# Patient Record
Sex: MALE | Race: WHITE | HISPANIC OR LATINO | Employment: UNEMPLOYED | URBAN - METROPOLITAN AREA
[De-identification: names, ages, dates, MRNs, and addresses within clinical notes are randomized per-mention and may not be internally consistent; named-entity substitution may affect disease eponyms.]

---

## 2018-06-08 ENCOUNTER — OFFICE VISIT - HEALTHEAST (OUTPATIENT)
Dept: INTERNAL MEDICINE | Facility: CLINIC | Age: 33
End: 2018-06-08

## 2018-06-08 DIAGNOSIS — F32.1 MODERATE SINGLE CURRENT EPISODE OF MAJOR DEPRESSIVE DISORDER (H): ICD-10-CM

## 2018-06-08 DIAGNOSIS — Z11.4 SCREENING FOR HIV (HUMAN IMMUNODEFICIENCY VIRUS): ICD-10-CM

## 2018-06-08 DIAGNOSIS — Z00.00 ROUTINE GENERAL MEDICAL EXAMINATION AT A HEALTH CARE FACILITY: ICD-10-CM

## 2018-06-08 DIAGNOSIS — Z13.220 SCREENING FOR HYPERLIPIDEMIA: ICD-10-CM

## 2018-06-08 DIAGNOSIS — Z11.59 NEED FOR HEPATITIS C SCREENING TEST: ICD-10-CM

## 2018-06-08 DIAGNOSIS — Z11.3 SCREEN FOR STD (SEXUALLY TRANSMITTED DISEASE): ICD-10-CM

## 2018-06-08 ASSESSMENT — MIFFLIN-ST. JEOR: SCORE: 1726.9

## 2018-06-12 ENCOUNTER — COMMUNICATION - HEALTHEAST (OUTPATIENT)
Dept: INTERNAL MEDICINE | Facility: CLINIC | Age: 33
End: 2018-06-12

## 2021-06-01 VITALS — BODY MASS INDEX: 24.48 KG/M2 | HEIGHT: 70 IN | WEIGHT: 171 LBS

## 2021-06-18 NOTE — PROGRESS NOTES
"  Office Visit - Physical   Kendall Pederson   32 y.o.  male    Date of visit: 6/8/2018  Physician: Cj Neumann MD     Assessment and Plan   1. Routine general medical examination at a health care facility  This is a 32-year-old man who was recently released from sn[facility].  He is homeless.  He has a prolapse or and has resources in the community.  He also has some brothers and sisters with him using contacts.  He has been depressed and would like to resume medications have been taking in snf.  See details below    2. Moderate single current episode of major depressive disorder  We will resume venlafaxine but not at 300 mg daily as this exceeds the recommended daily dose.  Rather we will restart at 150 mg daily.  Continue Remeron at 15 mg daily and establish with psychiatry.    \" Ambulatory referral to Psychiatry    3. Screen for STD (sexually transmitted disease)  Check gonorrhea chlamydia syphilis    4. Screening for HIV (human immunodeficiency virus)  HIV ordered    5. Need for hepatitis C screening test  Screening tests ordered, has a lot of soft tattoos, denies ever using IV drugs    6. Screening for hyperlipidemia  Lipid panel    Return in about 4 weeks (around 7/6/2018) for recheck.     Chief Complaint   Chief Complaint   Patient presents with     Medication Refill        Patient Profile   Social History     Social History Narrative    Homeless.  In snf previous 16 months (6/8/2018).          Past Medical History   Patient Active Problem List   Diagnosis     Major depressive disorder, single episode       Past Surgical History  He has a past surgical history that includes No past surgeries.     History of Present Illness   This 32 y.o. old man comes in for initial care after release from snf, annual physical and evaluation of depression.  He apparently been in snf for the past 16 months.  On present he was receiving venlafaxine 300 mg daily as well as mirtazapine 50 mg daily.  He generally had " "okay controlled depression, some depressed mood and feeling poorly about himself.  Since his been released he has been homeless.  He has multiple brothers and sisters has not been in contact with them.  He does have children but he has no contact with them.  He is not working.  He does have prolapse or.  He has been in contact with  and is applied for insurance as well as other social benefits.  He does not have housing.  His mood is been generally depressed.  He has not seen a psychiatrist or therapist.  He has some in the past.  Denies thoughts of self-harm or harm to others.    Review of Systems: A comprehensive review of systems was negative except as noted.     Medications and Allergies   Current Outpatient Prescriptions   Medication Sig Dispense Refill     mirtazapine (REMERON) 15 MG tablet Take 1 tablet (15 mg total) by mouth at bedtime. 90 tablet 3     venlafaxine (EFFEXOR-XR) 150 MG 24 hr capsule Take 1 capsule (150 mg total) by mouth daily. 90 capsule 3     No current facility-administered medications for this visit.      Allergies   Allergen Reactions     Fluoxetine Hives        Family and Social History   Family History   Problem Relation Age of Onset     Cancer Mother      unknown     No Medical Problems Father      lives in [place]     No Medical Problems Sister      No Medical Problems Brother      No Medical Problems Sister      No Medical Problems Sister      No Medical Problems Sister      No Medical Problems Sister      No Medical Problems Brother      No Medical Problems Brother      No Medical Problems Daughter      estranged     No Medical Problems Daughter      estranged        Social History   Substance Use Topics     Smoking status: Former Smoker     Smokeless tobacco: Never Used     Alcohol use No        Physical Exam   General Appearance:   No acute distress    /70 (Patient Site: Right Arm, Patient Position: Sitting, Cuff Size: Adult Regular)  Pulse 74  Ht 5' 10\" " (1.778 m)  Wt 171 lb (77.6 kg)  SpO2 99%  BMI 24.54 kg/m2    EYES: Eyelids, conjunctiva, and sclera were normal. Pupils were normal. Cornea, iris, and lens were normal bilaterally.  HEAD, EARS, NOSE, MOUTH, AND THROAT: Head and face were normal. Hearing was normal to voice and the ears were normal to external exam. Nose appearance was normal and there was no discharge. Oropharynx was normal.  NECK: Neck appearance was normal. There were no neck masses and the thyroid was not enlarged.  RESPIRATORY: Breathing pattern was normal and the chest moved symmetrically.  Percussion/auscultatory percussion was normal.  Lung sounds were normal and there were no abnormal sounds.  CARDIOVASCULAR: Heart rate and rhythm were normal.  S1 and S2 were normal and there were no extra sounds or murmurs. Peripheral pulses in arms and legs were normal.  Jugular venous pressure was normal.  There was no peripheral edema.  GASTROINTESTINAL: The abdomen was normal in contour.  Bowel sounds were present.  Percussion detected no organ enlargement or tenderness.  Palpation detected no tenderness, mass, or enlarged organs.   MUSCULOSKELETAL: Skeletal configuration was normal and muscle mass was normal for age. Joint appearance was overall normal.  LYMPHATIC: There were no enlarged nodes.  SKIN/HAIR/NAILS: Skin color was normal.  There were no skin lesions.  Hair and nails were normal.  NEUROLOGIC: The patient was alert and oriented to person, place, time, and circumstance. Speech was normal. Cranial nerves were normal. Motor strength was normal for age. The patient was normally coordinated.  PSYCHIATRIC:  Mood and affect were normal and the patient had normal recent and remote memory. The patient's judgment and insight were normal.       Additional Information        Cj Neumann MD  Internal Medicine  Contact me at None

## 2024-04-11 ENCOUNTER — TELEPHONE (OUTPATIENT)
Dept: FAMILY MEDICINE | Facility: CLINIC | Age: 39
End: 2024-04-11
Payer: COMMERCIAL

## 2024-04-11 NOTE — TELEPHONE ENCOUNTER
[facility] phone call message- general phone call:    Reason for call: Pt is calling and requesting a medical marijuana card.  He would like to be set up for those appts.    Return call needed: Yes    OK to leave a message on voice mail? Yes    Advised patient to response may take up to 2 business days: Yes    Primary language: English      needed? No    Call taken on April 11, 2024 at 4:51 PM by Danny Skaggs

## 2024-04-12 NOTE — TELEPHONE ENCOUNTER
Resources given to patient via phone as he states he needs the card by end of week next week. Reviewed our clinics process. THEODORE Fernández

## 2024-07-09 ENCOUNTER — HOSPITAL ENCOUNTER (EMERGENCY)
Facility: CLINIC | Age: 39
Discharge: HOME | End: 2024-07-09
Attending: STUDENT IN AN ORGANIZED HEALTH CARE EDUCATION/TRAINING PROGRAM | Admitting: STUDENT IN AN ORGANIZED HEALTH CARE EDUCATION/TRAINING PROGRAM
Payer: MEDICAID

## 2024-07-09 ENCOUNTER — HOSPITAL ENCOUNTER (EMERGENCY)
Facility: CLINIC | Age: 39
Discharge: HOME | End: 2024-07-09
Attending: EMERGENCY MEDICINE
Payer: MEDICAID

## 2024-07-09 VITALS
DIASTOLIC BLOOD PRESSURE: 85 MMHG | TEMPERATURE: 97.8 F | SYSTOLIC BLOOD PRESSURE: 157 MMHG | OXYGEN SATURATION: 98 % | HEART RATE: 80 BPM | RESPIRATION RATE: 16 BRPM

## 2024-07-09 VITALS
DIASTOLIC BLOOD PRESSURE: 104 MMHG | HEART RATE: 108 BPM | RESPIRATION RATE: 18 BRPM | TEMPERATURE: 98.2 F | SYSTOLIC BLOOD PRESSURE: 153 MMHG | OXYGEN SATURATION: 96 %

## 2024-07-09 DIAGNOSIS — Z59.00 HOMELESSNESS: ICD-10-CM

## 2024-07-09 DIAGNOSIS — F15.10 METHAMPHETAMINE USE (H): Primary | ICD-10-CM

## 2024-07-09 DIAGNOSIS — S60.311A ABRASION OF RIGHT THUMB, INITIAL ENCOUNTER: ICD-10-CM

## 2024-07-09 PROCEDURE — 99283 EMERGENCY DEPT VISIT LOW MDM: CPT

## 2024-07-09 PROCEDURE — 99282 EMERGENCY DEPT VISIT SF MDM: CPT

## 2024-07-09 SDOH — ECONOMIC STABILITY - HOUSING INSECURITY: HOMELESSNESS UNSPECIFIED: Z59.00

## 2024-07-09 ASSESSMENT — COLUMBIA-SUICIDE SEVERITY RATING SCALE - C-SSRS
2. HAVE YOU ACTUALLY HAD ANY THOUGHTS OF KILLING YOURSELF IN THE PAST MONTH?: NO
1. IN THE PAST MONTH, HAVE YOU WISHED YOU WERE DEAD OR WISHED YOU COULD GO TO SLEEP AND NOT WAKE UP?: NO
1. IN THE PAST MONTH, HAVE YOU WISHED YOU WERE DEAD OR WISHED YOU COULD GO TO SLEEP AND NOT WAKE UP?: NO
6. HAVE YOU EVER DONE ANYTHING, STARTED TO DO ANYTHING, OR PREPARED TO DO ANYTHING TO END YOUR LIFE?: NO
6. HAVE YOU EVER DONE ANYTHING, STARTED TO DO ANYTHING, OR PREPARED TO DO ANYTHING TO END YOUR LIFE?: NO
2. HAVE YOU ACTUALLY HAD ANY THOUGHTS OF KILLING YOURSELF IN THE PAST MONTH?: NO

## 2024-07-09 ASSESSMENT — ACTIVITIES OF DAILY LIVING (ADL)
ADLS_ACUITY_SCORE: 35
ADLS_ACUITY_SCORE: 35

## 2024-07-09 NOTE — ED PROVIDER NOTES
Emergency Department Note      History of Present Illness     Chief Complaint   Drug use    HPI   Kendall Pederson is a very pleasant 38 year old male presenting with homelessness and methamphetamine use.  Patient was brought in by ambulance from the street.  Bystanders called police due to the patient reportedly lying down in the street with his pants off.  Patient was cooperative with EMS.  On evaluation, patient is very pleasant.  He reports using methamphetamine, smoking, regularly and probably last night.  He is homeless and usually stays with friends.  He denies use of alcohol or other drugs.  He denies suicidal ideation, homicidal ideation, auditory or visual hallucinations.  He would like help with his homelessness.  He has no medical complaints or other concerns at this time.  He does wish to leave.    Independent Historian   None    Review of External Notes   I personally reviewed notes from the patient's urgent care visit dated  3/4/2024 . This provided me with information regarding patient's baseline medical problems.     I personally reviewed the patient's chart, including available medication list and available past medical history, past surgical history, family history, and social history.    Physical Exam     Patient Vitals for the past 24 hrs:   BP Temp Temp src Pulse Resp SpO2   07/09/24 0606 (!) 157/85 97.8  F (36.6  C) Temporal 80 16 98 %      Physical Exam   General: Pleasant, young male, lying on stretcher, no shift, pes excavatum, slightly twitchy  HENT: Atraumatic, normocephalic  Eyes: Extraocular movements intact  Cardiovascular: Regular rate  Pulmonary: Easy work of breathing  Skin: Warm and dry  Neuro: Alert and oriented x 4  Psych: Cooperative, appropriate affect, normal speech, denies suicidal ideation, not visibly reacting to internal stimuli    Diagnostics     Lab Results   Labs Ordered and Resulted from Time of ED Arrival to Time of ED Departure - No data to display    Imaging   No  orders to display       EKG  No ECG performed.     Independent Interpretation   See ED Course below    ED Course      Medications Administered   Medications - No data to display    Procedures   Procedures   None performed    Discussion of Management   See ED Course below    Social Determinants of Health adding to complexity of care   Homelessness/Housing Insecurity. This affected the patient's management by leading to presentation in the emergency department today.     ED Course   Independent Interpretation / Discussion of Management / Repeat Assessments       Medical Decision Making / Diagnosis     CMS Diagnoses: None    MIPS       None    MDM   Patient presenting with methamphetamine use and homelessness.  Vital signs notable for elevated blood pressure, otherwise reassuring.  Patient does admit to using methamphetamine.  He has no complaints at this time and wishes to leave.  He does not meet any hold criteria.  He is pleasant, conversant, cooperative.  I feel he is appropriate for discharge at this time.  We will give the patient resources for homelessness and substance detoxification.  Advised the patient to return if he has any additional concerns.     Disposition   The patient was discharged.     Diagnosis     ICD-10-CM    1. Methamphetamine use (H)  F15.10       2. Homelessness  Z59.00            Discharge Medications   New Prescriptions    No medications on file       JENNIFER LIGHT MD  [employer]       Jennifer Light MD  07/09/24 0621

## 2024-07-09 NOTE — ED TRIAGE NOTES
Pt BIBA from the streets of [place]. Bystanders called PD due to pt rolling in the street with his pants off. Pt was redirectable on scene with EMS. Pt admits to meth use and is homeless. Per EMS Pt Hr 110's otherwise VSS. Pt is not on a hold. Denies SI.     Triage Assessment (Adult)       Row Name 07/09/24 0606          Triage Assessment    Airway WDL WDL        Respiratory WDL    Respiratory WDL WDL        Skin Circulation/Temperature WDL    Skin Circulation/Temperature WDL WDL        Cardiac WDL    Cardiac WDL WDL        Peripheral/Neurovascular WDL    Peripheral Neurovascular WDL WDL        Cognitive/Neuro/Behavioral WDL    Cognitive/Neuro/Behavioral WDL WDL

## 2024-07-09 NOTE — ED TRIAGE NOTES
States was just seen but never received medication for his fingers. Patient hyperactive in triage. Refuses to leave without seeing doctor for finger meds.      Triage Assessment (Adult)       Row Name 07/09/24 0718          Triage Assessment    Airway WDL WDL        Respiratory WDL    Respiratory WDL WDL        Skin Circulation/Temperature WDL    Skin Circulation/Temperature WDL WDL        Cardiac WDL    Cardiac WDL WDL        Peripheral/Neurovascular WDL    Peripheral Neurovascular WDL WDL        Cognitive/Neuro/Behavioral WDL    Cognitive/Neuro/Behavioral WDL WDL

## 2024-07-09 NOTE — ED PROVIDER NOTES
"  Emergency Department Note      History of Present Illness     Chief Complaint   Hand Pain and Homeless      HPI   Kendall Pederson is a 38 year old male with a history of anxiety, substance abuse, HTN, and depression who presents to the emergency department for evaluation of hand pain. He is currently homeless. He was seen earlier this morning and discharged by my partner Dr. Duuqe for methamphetamine usage, and states that \"he never received medication for his fingers.\" Upon arrival in the room, he is presenting with concerns for an infection adjacent to his right thumbnail, without pain or drainage. Denies any suicidal ideation, homicidal ideation, or visual or auditory hallucinations.     Independent Historian   None    Review of External Notes   None    Past Medical History     Medical History and Problem List   Anxiety  MDD  Transaminitis  Substance use disorder  HTN  Homelessness  Food insecurity    Medications   hydroxyzine  mirtazapine  tramadol  Venlafaxine  Gabapentin  quetiapine    Surgical History   None    Physical Exam     Patient Vitals for the past 24 hrs:   BP Temp Temp src Pulse Resp SpO2   07/09/24 0721 (!) 153/104 98.2  F (36.8  C) Oral 108 18 96 %     Physical Exam   General: Sitting on the ED bed  HEENT: Normocephalic, atraumatic  Cardiac: Well perfused  Pulm: Breathing comfortably, no accessory muscle usage, no conversational dyspnea  MSK: No bony deformities  Skin: Dry, small amount of scab over the distal ulnar side of the right thumb tip along the nail without purulence or evidence of abscess or erythema or tenderness  Neuro: Moves all extremities  Psych: Pleasant mood and affect   Diagnostics         EKG   None    Independent Interpretation   None    ED Course      Medications Administered   Medications - No data to display    Procedures   Procedures     Discussion of Management   None    ED Course   ED Course as of 07/09/24 0789   Tue Jul 09, 2024   2699 I obtained history and " examined the patient as noted above. I discussed plan for discharge home.           Optional/Additional Documentation  None    Medical Decision Making / Diagnosis     CMS Diagnoses: None    MIPS       None    MDM   Kendall Pederson is a 38-year-old male with history of methamphetamine abuse presents with concern for right thumb infection.  He does indeed have a small amount of scab along the side of the thumbnail.  There is no tenderness or swelling or erythema or drainage around that location, no clinical evidence of infection.  I do not think that imaging is warranted at this time.  I do not think that antibiotics are warranted at this time. The patient declined any further evaluation.  He denies suicidality or homicidality or ongoing hallucinations.  Patient discharged home.     Disposition   The patient was discharged.     Diagnosis     ICD-10-CM    1. Abrasion of right thumb, initial encounter  S60.311A              Scribe Disclosure:  I, Eleanor Lugo, am serving as a scribe at 7:28 AM on 7/9/2024 to document services personally performed by Justin Recinos MD based on my observations and the provider's statements to me.        Justin Recinos MD  07/09/24 7478

## 2024-07-12 ENCOUNTER — HOSPITAL ENCOUNTER (EMERGENCY)
Facility: CLINIC | Age: 39
Discharge: HOME | End: 2024-07-12
Payer: MEDICAID

## 2024-07-12 ASSESSMENT — ACTIVITIES OF DAILY LIVING (ADL): ADLS_ACUITY_SCORE: 33
